# Patient Record
Sex: FEMALE | Race: WHITE | NOT HISPANIC OR LATINO | Employment: FULL TIME | URBAN - METROPOLITAN AREA
[De-identification: names, ages, dates, MRNs, and addresses within clinical notes are randomized per-mention and may not be internally consistent; named-entity substitution may affect disease eponyms.]

---

## 2018-12-17 ENCOUNTER — TELEPHONE (OUTPATIENT)
Dept: SCHEDULING | Facility: IMAGING CENTER | Age: 33
End: 2018-12-17

## 2019-01-11 ENCOUNTER — TELEPHONE (OUTPATIENT)
Dept: MEDICAL GROUP | Facility: MEDICAL CENTER | Age: 34
End: 2019-01-11

## 2019-01-11 NOTE — TELEPHONE ENCOUNTER
Left message with patient about no show to appointment today 1/11/19.  Explained that this was her first no show and the no show policy.

## 2019-02-05 ENCOUNTER — TELEPHONE (OUTPATIENT)
Dept: SCHEDULING | Facility: IMAGING CENTER | Age: 34
End: 2019-02-05

## 2019-02-21 ENCOUNTER — OFFICE VISIT (OUTPATIENT)
Dept: MEDICAL GROUP | Facility: CLINIC | Age: 34
End: 2019-02-21
Payer: MEDICAID

## 2019-02-21 VITALS
BODY MASS INDEX: 20.58 KG/M2 | TEMPERATURE: 100.2 F | WEIGHT: 135.8 LBS | HEART RATE: 76 BPM | DIASTOLIC BLOOD PRESSURE: 68 MMHG | OXYGEN SATURATION: 98 % | HEIGHT: 68 IN | SYSTOLIC BLOOD PRESSURE: 106 MMHG | RESPIRATION RATE: 14 BRPM

## 2019-02-21 DIAGNOSIS — B37.31 VAGINA, CANDIDIASIS: ICD-10-CM

## 2019-02-21 DIAGNOSIS — Z00.00 ENCOUNTER FOR MEDICAL EXAMINATION TO ESTABLISH CARE: ICD-10-CM

## 2019-02-21 DIAGNOSIS — M41.113 JUVENILE IDIOPATHIC SCOLIOSIS OF CERVICOTHORACIC REGION: ICD-10-CM

## 2019-02-21 PROCEDURE — 99203 OFFICE O/P NEW LOW 30 MIN: CPT | Performed by: PHYSICIAN ASSISTANT

## 2019-02-21 RX ORDER — FLUCONAZOLE 150 MG/1
150 TABLET ORAL ONCE
Qty: 1 TAB | Refills: 0 | Status: SHIPPED | OUTPATIENT
Start: 2019-02-21 | End: 2019-02-21

## 2019-02-21 ASSESSMENT — PATIENT HEALTH QUESTIONNAIRE - PHQ9: CLINICAL INTERPRETATION OF PHQ2 SCORE: 0

## 2019-02-21 NOTE — ASSESSMENT & PLAN NOTE
Patient recently moved to [place] from [place] and wishes to establish care with a primary care provider today.

## 2019-02-21 NOTE — PROGRESS NOTES
Chief Complaint   Patient presents with   • Establish Care   • Scoliosis     History of scoliosis    Vaginal discharge    HISTORY OF PRESENT ILLNESS: Patient is a 34 y.o. female established patient who presents today for evaluation and management of:    Juvenile idiopathic scoliosis of cervicothoracic region  Patient had two surgeries as a child to correct the severe scoliosis in her cervicothoracic spine. She hasn't had this checked in a while and wishes to see orthopedics again for this.     Vagina, candidiasis  Patient states she has had increase in white vaginal discharge over the past couple of days without a foul smell and with mild irritation. She has had this in the past and it was cured with monostat. She is requesting prescription medication for this today.     Encounter for medical examination to establish care  Patient recently moved to [place] from [place] and wishes to establish care with a primary care provider today.        Patient Active Problem List    Diagnosis Date Noted   • Juvenile idiopathic scoliosis of cervicothoracic region 02/21/2019   • Vagina, candidiasis 02/21/2019   • Encounter for medical examination to establish care 02/21/2019       Allergies:Patient has no known allergies.    Current Outpatient Prescriptions   Medication Sig Dispense Refill   • fluconazole (DIFLUCAN) 150 MG tablet Take 1 Tab by mouth Once for 1 dose. 1 Tab 0     No current facility-administered medications for this visit.        Social History   Substance Use Topics   • Smoking status: Never Smoker   • Smokeless tobacco: Never Used   • Alcohol use 1.2 oz/week     2 Glasses of wine per week       Family Status   Relation Status   • Mo Alive   • Fa Alive   • Bro Alive   • Child Alive   • Child Alive   • Child Alive     Family History   Problem Relation Age of Onset   • Arthritis Mother    • Blood Clots Mother    • Hyperlipidemia Father    • Hypertension Father    • Diabetes Father    • Depression Father    • ADD /  "ADHD Brother    • ADD / ADHD Child        Review of Systems: See HPI above.   Constitutional: Negative for fever, chills, weight loss and malaise.   HENT: Negative for ear pain, nosebleeds, congestion, sore throat and neck pain.    Eyes: Negative for blurred vision.   Respiratory: Negative for shortness of breath, cough, sputum production and wheezing.    Cardiovascular: Negative for chest pain, palpitations, orthopnea and leg swelling.   Gastrointestinal: Negative for heartburn, nausea, vomiting and abdominal pain.   Genitourinary: Negative for dysuria, urgency and frequency.   Musculoskeletal: Positive for right hip pain, right sided low back pain and left sided shoulder pain. Negative for myalgias, back pain and joint pain.   Skin: Negative for rash and itching.   Neurological: Negative for dizziness, tingling, tremors, sensory change, focal weakness and headaches.   Endo/Heme/Allergies: Does not bruise/bleed easily.   Psychiatric/Behavioral: Negative for depression, suicidal ideas and memory loss.  The patient is not nervous/anxious and does not have insomnia.      Exam:  Blood pressure 106/68, pulse 76, temperature 37.9 °C (100.2 °F), temperature source Temporal, resp. rate 14, height 1.715 m (5' 7.5\"), weight 61.6 kg (135 lb 12.8 oz), SpO2 98 %.  Body mass index is 20.96 kg/m².  General:  Healthy-Appearing female in NAD  Head: is grossly normal.  Neck: Supple without masses. Thyroid is not visibly enlarged.  Pulmonary: Clear to ausculation. Normal effort. No rales, ronchi, or wheezing.  Cardiovascular: Regular rate and rhythm without murmur. Carotid pulses are intact and equal bilaterally.  Extremities: no clubbing, cyanosis, or edema.  Musculoskeletal: severe scoliosis with curvature toward right side, uneven hips and left shoulder higher than right while seated, right back side higher than left when he leans forward.     Medical decision-making and discussion:  1. Juvenile idiopathic scoliosis of " cervicothoracic region    - DX-SPINE-SCOLIOSIS STUDY; Future  - REFERRAL TO ORTHOPEDICS    2. Vagina, candidiasis    - fluconazole (DIFLUCAN) 150 MG tablet; Take 1 Tab by mouth Once for 1 dose.  Dispense: 1 Tab; Refill: 0    3. Encounter for medical examination to establish care  I am happy to participate in the care of this pleasant 34 year old Woman.         Please note that this dictation was created using voice recognition software. I have made every reasonable attempt to correct obvious errors, but I expect that there are errors of grammar and possibly content that I did not discover before finalizing the note.      Return in about 1 year (around 2/21/2020) for Wellness Physical.

## 2019-02-21 NOTE — ASSESSMENT & PLAN NOTE
Patient had two surgeries as a child to correct the severe scoliosis in her cervicothoracic spine. She hasn't had this checked in a while and wishes to see orthopedics again for this.

## 2019-02-21 NOTE — ASSESSMENT & PLAN NOTE
Patient states she has had increase in white vaginal discharge over the past couple of days without a foul smell and with mild irritation. She has had this in the past and it was cured with monostat. She is requesting prescription medication for this today.

## 2019-03-22 ENCOUNTER — OFFICE VISIT (OUTPATIENT)
Dept: MEDICAL GROUP | Facility: CLINIC | Age: 34
End: 2019-03-22
Payer: MEDICAID

## 2019-03-22 VITALS
TEMPERATURE: 88.8 F | HEART RATE: 77 BPM | RESPIRATION RATE: 12 BRPM | WEIGHT: 131 LBS | BODY MASS INDEX: 19.85 KG/M2 | DIASTOLIC BLOOD PRESSURE: 74 MMHG | HEIGHT: 68 IN | OXYGEN SATURATION: 95 % | SYSTOLIC BLOOD PRESSURE: 106 MMHG

## 2019-03-22 DIAGNOSIS — J01.41 ACUTE RECURRENT PANSINUSITIS: ICD-10-CM

## 2019-03-22 PROBLEM — Z00.00 ENCOUNTER FOR MEDICAL EXAMINATION TO ESTABLISH CARE: Status: RESOLVED | Noted: 2019-02-21 | Resolved: 2019-03-22

## 2019-03-22 PROCEDURE — 99213 OFFICE O/P EST LOW 20 MIN: CPT | Performed by: PHYSICIAN ASSISTANT

## 2019-03-22 RX ORDER — CETIRIZINE HYDROCHLORIDE 10 MG/1
10 TABLET ORAL DAILY
Qty: 90 TAB | Refills: 0 | Status: SHIPPED | OUTPATIENT
Start: 2019-03-22 | End: 2019-06-13

## 2019-03-22 RX ORDER — AMOXICILLIN AND CLAVULANATE POTASSIUM 875; 125 MG/1; MG/1
TABLET, FILM COATED ORAL
COMMUNITY
Start: 2019-03-10 | End: 2019-08-01

## 2019-03-22 RX ORDER — FLUCONAZOLE 150 MG/1
TABLET ORAL
COMMUNITY
Start: 2019-02-21 | End: 2019-08-01

## 2019-03-22 NOTE — PROGRESS NOTES
Chief Complaint   Patient presents with   • Sinus Pain     meds not working       HISTORY OF PRESENT ILLNESS: Patient is a 34 y.o. female established patient who presents today for evaluation and management of:    Acute recurrent pansinusitis  Subacute, started about 3 months ago and was recently trreated with antibiotics but nasal sinus congestion persists. Vinicius has not been using allergy medications despite knowing that she has allergies. She has also not been doing sinus rinses although she states these have helped in the past. She denies fevers, chills and general malaise.        Patient Active Problem List    Diagnosis Date Noted   • Acute recurrent pansinusitis 03/22/2019   • Juvenile idiopathic scoliosis of cervicothoracic region 02/21/2019   • Vagina, candidiasis 02/21/2019       Allergies:Patient has no known allergies.    Current Outpatient Prescriptions   Medication Sig Dispense Refill   • amoxicillin-clavulanate (AUGMENTIN) 875-125 MG Tab      • cetirizine (ZYRTEC) 10 MG Tab Take 1 Tab by mouth every day. 90 Tab 0   • fluconazole (DIFLUCAN) 150 MG tablet        No current facility-administered medications for this visit.        Social History   Substance Use Topics   • Smoking status: Never Smoker   • Smokeless tobacco: Never Used   • Alcohol use 1.2 oz/week     2 Glasses of wine per week       Family Status   Relation Status   • Mo Alive   • Fa Alive   • Bro Alive   • Child Alive   • Child Alive   • Child Alive     Family History   Problem Relation Age of Onset   • Arthritis Mother    • Blood Clots Mother    • Hyperlipidemia Father    • Hypertension Father    • Diabetes Father    • Depression Father    • ADD / ADHD Brother    • ADD / ADHD Child        Review of Systems: See HPI above.   Constitutional: Negative for fever, chills, weight loss and malaise.   HENT: Positive for ear pain,  congestion, sore throat and cough. Negative for nosebleeds or neck pain.    Eyes: Negative for blurred vision.  "  Respiratory: Negative for shortness of breath,  sputum production and wheezing.    Cardiovascular: Negative for chest pain, palpitations, orthopnea and leg swelling.   Endo/Heme/Allergies: Does not bruise/bleed easily. See above for allergies.   Psychiatric/Behavioral: Negative for depression, suicidal ideas and memory loss.  The patient is not nervous/anxious and does not have insomnia.      Exam:  Blood pressure 106/74, pulse 77, temperature (!) 31.6 °C (88.8 °F), temperature source Temporal, resp. rate 12, height 1.715 m (5' 7.5\"), weight 59.4 kg (131 lb), SpO2 95 %.  Body mass index is 20.21 kg/m².  General:  Thin female in NAD  HEENT: head is grossly normal. Nose with severe septal deviation. Nasal mucousa pink with clear thick drainage.  Neck: Supple without masses. Thyroid is not visibly enlarged.  Pulmonary: Clear to ausculation. Normal effort. No rales, ronchi, or wheezing.  Cardiovascular: Regular rate and rhythm without murmur. Carotid pulses are intact and equal bilaterally.  Extremities: no clubbing, cyanosis, or edema.    Medical decision-making and discussion:  1. Acute recurrent pansinusitis  Start daily neti-pot after showering before bed. Remove allergens from home and clean home thoroughly.   - DX-SINUSES-PARANASAL LTD 2-; Future  - cetirizine (ZYRTEC) 10 MG Tab; Take 1 Tab by mouth every day.  Dispense: 90 Tab; Refill: 0      Please note that this dictation was created using voice recognition software. I have made every reasonable attempt to correct obvious errors, but I expect that there are errors of grammar and possibly content that I did not discover before finalizing the note.      Return in about 4 weeks (around 4/19/2019) for sinus issues. .  "

## 2019-03-22 NOTE — ASSESSMENT & PLAN NOTE
Subacute, started about 3 months ago and was recently trreated with antibiotics but nasal sinus congestion persists. Vinicius has not been using allergy medications despite knowing that she has allergies. She has also not been doing sinus rinses although she states these have helped in the past. She denies fevers, chills and general malaise.

## 2019-06-13 ENCOUNTER — OFFICE VISIT (OUTPATIENT)
Dept: MEDICAL GROUP | Facility: CLINIC | Age: 34
End: 2019-06-13
Payer: MEDICAID

## 2019-06-13 VITALS
BODY MASS INDEX: 20.88 KG/M2 | TEMPERATURE: 98.7 F | RESPIRATION RATE: 16 BRPM | WEIGHT: 133 LBS | OXYGEN SATURATION: 97 % | HEIGHT: 67 IN | DIASTOLIC BLOOD PRESSURE: 78 MMHG | SYSTOLIC BLOOD PRESSURE: 124 MMHG | HEART RATE: 80 BPM

## 2019-06-13 DIAGNOSIS — Z72.51 HIGH RISK HETEROSEXUAL BEHAVIOR: ICD-10-CM

## 2019-06-13 DIAGNOSIS — Z87.42 HISTORY OF ABNORMAL CERVICAL PAPANICOLAOU SMEAR: ICD-10-CM

## 2019-06-13 DIAGNOSIS — D18.01 CHERRY ANGIOMA: ICD-10-CM

## 2019-06-13 PROBLEM — B37.31 VAGINA, CANDIDIASIS: Status: RESOLVED | Noted: 2019-02-21 | Resolved: 2019-06-13

## 2019-06-13 PROCEDURE — 99213 OFFICE O/P EST LOW 20 MIN: CPT | Performed by: PHYSICIAN ASSISTANT

## 2019-06-13 RX ORDER — HYDROCODONE BITARTRATE AND ACETAMINOPHEN 5; 325 MG/1; MG/1
TABLET ORAL
COMMUNITY
Start: 2019-03-30 | End: 2019-08-01

## 2019-06-13 RX ORDER — CYCLOBENZAPRINE HCL 10 MG
TABLET ORAL
COMMUNITY
Start: 2019-03-30 | End: 2019-08-01

## 2019-06-13 RX ORDER — M-VIT,TX,IRON,MINS/CALC/FOLIC 27MG-0.4MG
1 TABLET ORAL DAILY
COMMUNITY
End: 2019-08-01

## 2019-06-13 RX ORDER — FEXOFENADINE HCL 60 MG/1
60 TABLET, FILM COATED ORAL DAILY
Qty: 30 TAB | Refills: 0
Start: 2019-06-13 | End: 2019-08-01

## 2019-06-13 NOTE — ASSESSMENT & PLAN NOTE
Unsure of STD status although has been with the same partner since pregnancy, can't recall the results of her tests when she was pregnant. Also presents with sexual partner today so patient may not be discussing entirety of sexual health at this time.

## 2019-06-13 NOTE — PROGRESS NOTES
Chief Complaint   Patient presents with   • Sexually Transmitted Diseases   • Other     skin lesion?       HISTORY OF PRESENT ILLNESS: Patient is a 34 y.o. female established patient who presents today for evaluation and management of:    High risk heterosexual behavior  Unsure of STD status although has been with the same partner since pregnancy, can't recall the results of her tests when she was pregnant. Also presents with sexual partner today so patient may not be discussing entirety of sexual health at this time.     History of abnormal cervical Papanicolaou smear  Has history of abnormal PAP smear with normal results after cryotherapy. Is overdue for recheck of this since it's been about a year. Denies symptoms.     Ornelas angioma  Over the past month or so, this patient has noticed 7 small bright red dots on her abdomen, arms and legs.  She is concerned about these and states they are not painful, growing or draining.       Patient Active Problem List    Diagnosis Date Noted   • Cherry angioma 06/13/2019   • History of abnormal cervical Papanicolaou smear 06/13/2019   • High risk heterosexual behavior 06/13/2019   • Acute recurrent pansinusitis 03/22/2019   • Juvenile idiopathic scoliosis of cervicothoracic region 02/21/2019       Allergies:Patient has no known allergies.    Current Outpatient Prescriptions   Medication Sig Dispense Refill   • fexofenadine (ALLEGRA) 60 MG Tab Take 1 Tab by mouth every day. 30 Tab 0   • therapeutic multivitamin-minerals (THERAGRAN-M) Tab Take 1 Tab by mouth every day.     • amoxicillin-clavulanate (AUGMENTIN) 875-125 MG Tab      • fluconazole (DIFLUCAN) 150 MG tablet        No current facility-administered medications for this visit.        Social History   Substance Use Topics   • Smoking status: Never Smoker   • Smokeless tobacco: Never Used   • Alcohol use 1.2 oz/week     2 Glasses of wine per week       Family Status   Relation Status   • Mo Alive   • Fa Alive   • Bro  "Alive   • Child Alive   • Child Alive   • Child Alive     Family History   Problem Relation Age of Onset   • Arthritis Mother    • Blood Clots Mother    • Hyperlipidemia Father    • Hypertension Father    • Diabetes Father    • Depression Father    • ADD / ADHD Brother    • ADD / ADHD Child        Review of Systems: See HPI above.   Constitutional: Negative for fever, chills, weight loss and malaise.   HENT: Negative for ear pain, nosebleeds, congestion, sore throat and neck pain.    Eyes: Negative for blurred vision.   Respiratory: Negative for shortness of breath, cough, sputum production and wheezing.    Cardiovascular: Negative for chest pain, palpitations, orthopnea and leg swelling.   Gastrointestinal: Negative for heartburn, nausea, vomiting and abdominal pain.   Genitourinary: Negative for dysuria, urgency and frequency.   Musculoskeletal: Negative for myalgias, back pain and joint pain.   Skin: Negative for rash and itching. See above.   Neurological: Negative for dizziness, tingling, tremors, sensory change, focal weakness and headaches.   Endo/Heme/Allergies: Does not bruise/bleed easily.   Psychiatric/Behavioral: Negative for depression, suicidal ideas and memory loss.  The patient is not nervous/anxious and does not have insomnia.      Exam:  /78 (BP Location: Right arm, Patient Position: Sitting, BP Cuff Size: Adult)   Pulse 80   Temp 37.1 °C (98.7 °F) (Temporal)   Resp 16   Ht 1.702 m (5' 7\")   Wt 60.3 kg (133 lb)   SpO2 97%   Body mass index is 20.83 kg/m².  General:  Healthy-Appearing female in NAD  Head: is grossly normal. Poor dentition.   Neck: Supple without masses. Thyroid is not visibly enlarged.  Pulmonary:  Normal effort.   Cardiovascular: Carotid pulses are intact and equal bilaterally.  Extremities: no clubbing, cyanosis, or edema.    Medical decision-making and discussion:  1. High risk heterosexual behavior    - HIV AG/AB COMBO ASSAY SCREENING; Future  - CHLAMYDIA/GC PCR " URINE OR SWAB; Future  - T.PALLIDUM AB EIA; Future  - HEPATITIS PANEL ACUTE(4 COMPONENTS); Future    2. History of abnormal cervical Papanicolaou smear    - REFERRAL TO OB/GYN    3. Ornelas angioma  reassured these are normal variant.       Please note that this dictation was created using voice recognition software. I have made every reasonable attempt to correct obvious errors, but I expect that there are errors of grammar and possibly content that I did not discover before finalizing the note.      Return if symptoms worsen or fail to improve.

## 2019-06-13 NOTE — ASSESSMENT & PLAN NOTE
Has history of abnormal PAP smear with normal results after cryotherapy. Is overdue for recheck of this since it's been about a year. Denies symptoms.

## 2019-06-13 NOTE — ASSESSMENT & PLAN NOTE
Over the past month or so, this patient has noticed 7 small bright red dots on her abdomen, arms and legs.  She is concerned about these and states they are not painful, growing or draining.

## 2019-06-14 ENCOUNTER — APPOINTMENT (OUTPATIENT)
Dept: MEDICAL GROUP | Facility: CLINIC | Age: 34
End: 2019-06-14
Payer: MEDICAID

## 2019-08-01 ENCOUNTER — OFFICE VISIT (OUTPATIENT)
Dept: MEDICAL GROUP | Facility: CLINIC | Age: 34
End: 2019-08-01
Payer: MEDICAID

## 2019-08-01 ENCOUNTER — HOSPITAL ENCOUNTER (OUTPATIENT)
Facility: MEDICAL CENTER | Age: 34
End: 2019-08-01
Attending: PHYSICIAN ASSISTANT
Payer: MEDICAID

## 2019-08-01 VITALS
HEIGHT: 67 IN | RESPIRATION RATE: 12 BRPM | DIASTOLIC BLOOD PRESSURE: 60 MMHG | OXYGEN SATURATION: 98 % | SYSTOLIC BLOOD PRESSURE: 114 MMHG | BODY MASS INDEX: 20.88 KG/M2 | WEIGHT: 133 LBS | TEMPERATURE: 98.6 F | HEART RATE: 77 BPM

## 2019-08-01 DIAGNOSIS — R00.2 INTERMITTENT PALPITATIONS: ICD-10-CM

## 2019-08-01 DIAGNOSIS — N30.00 ACUTE CYSTITIS WITHOUT HEMATURIA: ICD-10-CM

## 2019-08-01 DIAGNOSIS — J01.41 RECURRENT PANSINUSITIS: ICD-10-CM

## 2019-08-01 DIAGNOSIS — B37.9 CANDIDIASIS: ICD-10-CM

## 2019-08-01 PROBLEM — Z72.51 HIGH RISK HETEROSEXUAL BEHAVIOR: Status: RESOLVED | Noted: 2019-06-13 | Resolved: 2019-08-01

## 2019-08-01 LAB
APPEARANCE UR: CLEAR
APPEARANCE UR: CLEAR
BACTERIA #/AREA URNS HPF: ABNORMAL /HPF
BILIRUB UR QL STRIP.AUTO: NEGATIVE
BILIRUB UR STRIP-MCNC: NEGATIVE MG/DL
COLOR UR AUTO: NORMAL
COLOR UR: YELLOW
EPI CELLS #/AREA URNS HPF: ABNORMAL /HPF
GLUCOSE UR STRIP.AUTO-MCNC: NEGATIVE MG/DL
GLUCOSE UR STRIP.AUTO-MCNC: NEGATIVE MG/DL
HYALINE CASTS #/AREA URNS LPF: ABNORMAL /LPF
KETONES UR STRIP.AUTO-MCNC: NEGATIVE MG/DL
KETONES UR STRIP.AUTO-MCNC: NORMAL MG/DL
LEUKOCYTE ESTERASE UR QL STRIP.AUTO: ABNORMAL
LEUKOCYTE ESTERASE UR QL STRIP.AUTO: NORMAL
MICRO URNS: ABNORMAL
NITRITE UR QL STRIP.AUTO: NEGATIVE
NITRITE UR QL STRIP.AUTO: NEGATIVE
PH UR STRIP.AUTO: 5.5 [PH] (ref 5–8)
PH UR STRIP.AUTO: 6 [PH] (ref 5–8)
PROT UR QL STRIP: NEGATIVE MG/DL
PROT UR QL STRIP: NEGATIVE MG/DL
RBC # URNS HPF: ABNORMAL /HPF
RBC UR QL AUTO: ABNORMAL
RBC UR QL AUTO: NEGATIVE
SP GR UR STRIP.AUTO: 1.02
SP GR UR STRIP.AUTO: 1.03
UROBILINOGEN UR STRIP-MCNC: 0.2 MG/DL
UROBILINOGEN UR STRIP.AUTO-MCNC: 0.2 MG/DL
WBC #/AREA URNS HPF: ABNORMAL /HPF

## 2019-08-01 PROCEDURE — 99213 OFFICE O/P EST LOW 20 MIN: CPT | Mod: 25 | Performed by: PHYSICIAN ASSISTANT

## 2019-08-01 PROCEDURE — 81002 URINALYSIS NONAUTO W/O SCOPE: CPT | Performed by: PHYSICIAN ASSISTANT

## 2019-08-01 PROCEDURE — 81001 URINALYSIS AUTO W/SCOPE: CPT

## 2019-08-01 RX ORDER — NITROFURANTOIN 25; 75 MG/1; MG/1
100 CAPSULE ORAL EVERY 12 HOURS
Qty: 10 CAP | Refills: 0 | Status: SHIPPED | OUTPATIENT
Start: 2019-08-01 | End: 2019-08-06

## 2019-08-01 RX ORDER — FLUCONAZOLE 150 MG/1
150 TABLET ORAL ONCE
Qty: 1 TAB | Refills: 0 | Status: SHIPPED | OUTPATIENT
Start: 2019-08-01 | End: 2019-08-01

## 2019-08-01 SDOH — HEALTH STABILITY: MENTAL HEALTH: HOW OFTEN DO YOU HAVE A DRINK CONTAINING ALCOHOL?: 2-4 TIMES A MONTH

## 2019-08-01 SDOH — HEALTH STABILITY: MENTAL HEALTH: HOW MANY STANDARD DRINKS CONTAINING ALCOHOL DO YOU HAVE ON A TYPICAL DAY?: 1 OR 2

## 2019-08-01 SDOH — HEALTH STABILITY: MENTAL HEALTH: HOW OFTEN DO YOU HAVE 6 OR MORE DRINKS ON ONE OCCASION?: NEVER

## 2019-08-01 NOTE — ASSESSMENT & PLAN NOTE
HPI:  Symptom onset: 7 days ago   Current symptoms: Painful, urgent, frequent voids. Itchy labia. No blood noted in urine.   Since onset symptoms are: Unchanged  Treatments tried: OTC antifungal  Associated symptoms: Negative for fever, flank pain, nausea and vomiting, vaginal discharge, pelvic pain.  History is positive for frequent UTI.

## 2019-08-01 NOTE — ASSESSMENT & PLAN NOTE
This patient states that about once per month she will have the sensation that she loses her breath and that her heart beats very quickly.  She is mostly concerned because her significant other has early onset heart failure.  She denies lightheadedness lasting longer than 5 seconds she is unsure if this is worse when she is dehydrated but does not notice any difference when she is drinking caffeine or alcohol.

## 2019-08-01 NOTE — PATIENT INSTRUCTIONS
Urinary Tract Infection, Adult  Introduction  A urinary tract infection (UTI) is an infection of any part of the urinary tract. The urinary tract includes the:  · Kidneys.  · Ureters.  · Bladder.  · Urethra.  These organs make, store, and get rid of pee (urine) in the body.  Follow these instructions at home:  · Take over-the-counter and prescription medicines only as told by your doctor.  · If you were prescribed an antibiotic medicine, take it as told by your doctor. Do not stop taking the antibiotic even if you start to feel better.  · Avoid the following drinks:  ¨ Alcohol.  ¨ Caffeine.  ¨ Tea.  ¨ Carbonated drinks.  · Drink enough fluid to keep your pee clear or pale yellow.  · Keep all follow-up visits as told by your doctor. This is important.  · Make sure to:  ¨ Empty your bladder often and completely. Do not to hold pee for long periods of time.  ¨ Empty your bladder before and after sex.  ¨ Wipe from front to back after a bowel movement if you are female. Use each tissue one time when you wipe.  Contact a doctor if:  · You have back pain.  · You have a fever.  · You feel sick to your stomach (nauseous).  · You throw up (vomit).  · Your symptoms do not get better after 3 days.  · Your symptoms go away and then come back.  Get help right away if:  · You have very bad back pain.  · You have very bad lower belly (abdominal) pain.  · You are throwing up and cannot keep down any medicines or water.  This information is not intended to replace advice given to you by your health care provider. Make sure you discuss any questions you have with your health care provider.  Document Released: 06/05/2009 Document Revised: 05/25/2017 Document Reviewed: 11/07/2016  © 2017 [employer]    
Orthopedic

## 2019-08-01 NOTE — PROGRESS NOTES
Chief Complaint   Patient presents with   • Vaginitis   • Sinus Problem   • Irregular Heart Beat     skips beat        HISTORY OF PRESENT ILLNESS: Patient is a 34 y.o. female established patient who presents today for evaluation and management of:    Acute cystitis without hematuria  HPI:  Symptom onset: 7 days ago   Current symptoms: Painful, urgent, frequent voids. Itchy labia. No blood noted in urine.   Since onset symptoms are: Unchanged  Treatments tried: OTC antifungal  Associated symptoms: Negative for fever, flank pain, nausea and vomiting, vaginal discharge, pelvic pain.  History is positive for frequent UTI.     Intermittent palpitations  This patient states that about once per month she will have the sensation that she loses her breath and that her heart beats very quickly.  She is mostly concerned because her significant other has early onset heart failure.  She denies lightheadedness lasting longer than 5 seconds she is unsure if this is worse when she is dehydrated but does not notice any difference when she is drinking caffeine or alcohol.    Recurrent pansinusitis  This is now her chronic condition that started about 6 months ago.  This patient was treated with antibiotics multiple times and nasal sinus congestion persists.  She is requesting referral to ear nose and throat.  She does not feel as though decongestion or antihistamine medicines are working part.       Patient Active Problem List    Diagnosis Date Noted   • Intermittent palpitations 08/01/2019   • Acute cystitis without hematuria 08/01/2019   • Cherry angioma 06/13/2019   • History of abnormal cervical Papanicolaou smear 06/13/2019   • Recurrent pansinusitis 03/22/2019   • Juvenile idiopathic scoliosis of cervicothoracic region 02/21/2019       Allergies:Patient has no known allergies.    Current Outpatient Medications   Medication Sig Dispense Refill   • nitrofurantoin monohyd macro (MACROBID) 100 MG Cap Take 1 Cap by mouth every 12  hours for 5 days. 10 Cap 0   • fluconazole (DIFLUCAN) 150 MG tablet Take 1 Tab by mouth Once for 1 dose. 1 Tab 0     No current facility-administered medications for this visit.        Social History     Tobacco Use   • Smoking status: Never Smoker   • Smokeless tobacco: Never Used   Substance Use Topics   • Alcohol use: Yes     Frequency: 2-4 times a month     Drinks per session: 1 or 2     Binge frequency: Never   • Drug use: No       Family Status   Relation Name Status   • Mo  Alive   • Fa  Alive   • Bro  Alive   • Child  Alive   • Child  Alive   • Child  Alive     Family History   Problem Relation Age of Onset   • Arthritis Mother    • Blood Clots Mother    • Hyperlipidemia Father    • Hypertension Father    • Diabetes Father    • Depression Father    • ADD / ADHD Brother    • ADD / ADHD Child        Review of Systems: See HPI above.   Constitutional: Negative for fever, chills, weight loss and malaise.   HENT: Negative for ear pain, nosebleeds, congestion, sore throat and neck pain.    Eyes: Negative for blurred vision.   Respiratory: Negative for shortness of breath, cough, sputum production and wheezing.    Cardiovascular: Negative for chest pain, orthopnea and leg swelling.   Gastrointestinal: Negative for heartburn, nausea, vomiting and abdominal pain.   Genitourinary: Positive for dysuria, urgency and frequency as well as itchy labia.   Musculoskeletal: Negative for myalgias, back pain and joint pain.   Skin: Negative for rash and itching.   Neurological: Negative for dizziness, tingling, tremors, sensory change, focal weakness and headaches.   Endo/Heme/Allergies: Does not bruise/bleed easily.   Psychiatric/Behavioral: Negative for depression, suicidal ideas and memory loss.  The patient is not nervous/anxious and does not have insomnia.      Exam:  /60 (BP Location: Left arm, Patient Position: Sitting, BP Cuff Size: Adult)   Pulse 77   Temp 37 °C (98.6 °F) (Temporal)   Resp 12   Ht 1.702 m (5'  "7\")   Wt 60.3 kg (133 lb)   SpO2 98%   Body mass index is 20.83 kg/m².  General:  Thin female in NAD  Head: is grossly normal.  Neck: Supple without masses. Thyroid is not visibly enlarged.  Pulmonary: Clear to ausculation. Normal effort. No rales, ronchi, or wheezing.  Cardiovascular: Regular rate and rhythm without murmur. Carotid pulses are intact and equal bilaterally.  Extremities: no clubbing, cyanosis, or edema.    Medical decision-making and discussion:  1. Acute cystitis without hematuria  1. Abnormal urine dipstick in office. Urine sent for culture. Start antibiotics.  2. Provided education to drink plenty of fluids, wipe front to back every void and bowel movement.   3. Return to clinic if symptoms not improving within 3-4 days or in case of vomiting, fever, increasing pain.  - Urinalysis, Culture if Indicated; Future  - POCT Urinalysis  - nitrofurantoin monohyd macro (MACROBID) 100 MG Cap; Take 1 Cap by mouth every 12 hours for 5 days.  Dispense: 10 Cap; Refill: 0    2. Candidiasis  In case this patinet develops a yeast infection from her antibiotics:  - fluconazole (DIFLUCAN) 150 MG tablet; Take 1 Tab by mouth Once for 1 dose.  Dispense: 1 Tab; Refill: 0    3. Intermittent palpitations  Considering PVC's as primary differential.   - [facility] - Cardiology Performed (48HR); Future    4. Recurrent pansinusitis    - REFERRAL TO ENT      Please note that this dictation was created using voice recognition software. I have made every reasonable attempt to correct obvious errors, but I expect that there are errors of grammar and possibly content that I did not discover before finalizing the note.      Return if symptoms worsen or fail to improve.    "

## 2019-08-01 NOTE — ASSESSMENT & PLAN NOTE
This is now her chronic condition that started about 6 months ago.  This patient was treated with antibiotics multiple times and nasal sinus congestion persists.  She is requesting referral to ear nose and throat.  She does not feel as though decongestion or antihistamine medicines are working part.

## 2019-11-21 ENCOUNTER — HOSPITAL ENCOUNTER (OUTPATIENT)
Facility: MEDICAL CENTER | Age: 34
End: 2019-11-21
Attending: PHYSICIAN ASSISTANT
Payer: MEDICAID

## 2019-11-21 ENCOUNTER — OFFICE VISIT (OUTPATIENT)
Dept: MEDICAL GROUP | Facility: CLINIC | Age: 34
End: 2019-11-21
Payer: MEDICAID

## 2019-11-21 VITALS
HEART RATE: 77 BPM | WEIGHT: 128 LBS | HEIGHT: 67 IN | SYSTOLIC BLOOD PRESSURE: 120 MMHG | DIASTOLIC BLOOD PRESSURE: 76 MMHG | TEMPERATURE: 97.8 F | RESPIRATION RATE: 16 BRPM | OXYGEN SATURATION: 96 % | BODY MASS INDEX: 20.09 KG/M2

## 2019-11-21 DIAGNOSIS — Z12.4 SCREENING FOR MALIGNANT NEOPLASM OF CERVIX: ICD-10-CM

## 2019-11-21 DIAGNOSIS — Z01.419 WELL FEMALE EXAM WITH ROUTINE GYNECOLOGICAL EXAM: ICD-10-CM

## 2019-11-21 PROCEDURE — 87591 N.GONORRHOEAE DNA AMP PROB: CPT

## 2019-11-21 PROCEDURE — 88175 CYTOPATH C/V AUTO FLUID REDO: CPT

## 2019-11-21 PROCEDURE — 87491 CHLMYD TRACH DNA AMP PROBE: CPT

## 2019-11-21 PROCEDURE — 99395 PREV VISIT EST AGE 18-39: CPT | Mod: EP | Performed by: PHYSICIAN ASSISTANT

## 2019-11-21 NOTE — PROGRESS NOTES
SUBJECTIVE: 34 y.o. female for routine pap and checkup.  Chief Complaint   Patient presents with   • Gynecologic Exam     Concerned about RLQ pain related to possible ovarian cyst.       Last Pap: 2018  Contraception: tubal ligation  H/O Abnormal Pap yes, recently cleared by gynecology to return to PCP  H/O STI yes, treated with antibiotic has a low risk form of HPV  Current partner: male, monogamous     LMP: Patient's last menstrual period was 2019 (exact date).    Allergies: Patient has no known allergies.     ROS:  Menses every month with mild cramping  Bleeding is moderate.    no analgesics required during menses.  Positive for menstrual depression, labile mood, anxiety, bloating/fluid retention, insomnia, migraine headaches, libido changes   no menopause symptoms of hot flashes, night sweats, sleep disruption, mood changes, vaginal dryness.   No pelvic pain, or dyspareunia. No vaginal discharge   No breast tenderness, mass, nipple discharge, changes in size or contour, or abnormal cyclic discomfort.  No urinary tract symptoms, no incontinence.   No abdominal pain, change in bowel habits, black or bloody stools.    No unusual headaches, no visual changes.   No prolonged cough. No dyspnea or chest pain on exertion.    No skin lesions, concerns.     Preventive Care:  Mammogram not due til 42   DEXA not due  Colonoscopy not due  HPV vaccine - unsure if she got these.     No current outpatient medications on file.     No current facility-administered medications for this visit.      She  has no past medical history on file.  She  has a past surgical history that includes tubal ligation and dilation and curettage.     Family History:   Family History   Problem Relation Age of Onset   • Arthritis Mother    • Blood Clots Mother    • Hyperlipidemia Father    • Hypertension Father    • Diabetes Father    • Depression Father    • ADD / ADHD Brother    • ADD / ADHD Child        Family History negative for :  "Colon, Lung, or female organ cancer, osteoporosis.     OBJECTIVE:   /76 (BP Location: Left arm, Patient Position: Sitting, BP Cuff Size: Adult)   Pulse 77   Temp 36.6 °C (97.8 °F) (Temporal)   Resp 16   Ht 1.702 m (5' 7\")   Wt 58.1 kg (128 lb)   LMP 11/17/2019 (Exact Date)   SpO2 96%   BMI 20.05 kg/m²   Body mass index is 20.05 kg/m².      HEAD AND NECK:  Ears normal.  Throat, oral cavity and tongue normal.  Neck supple. No adenopathy or masses in the neck or supraclavicular regions.  No carotid bruits. No thyromegaly.   NEURO: Cranial nerves are normal. DTR's normal and symmetric.    CHEST:  Clear, good air entry, no wheezes or rales.   HEART:  Regular rate and rhythm.  S1 and S2 normal. No edema or JVD.   ABDOMEN:  Soft without tenderness, guarding, mass or organomegaly.  No CVA tenderness or inguinal adenopathy.   EXTREMITIES:  Extremities, reflexes and peripheral pulses are normal.    SKIN:  No rashes or suspicious skin lesions noted.     Breast Exam: Performed with instruction during examination. No axillary lymphadenopathy. No fixed or dominant masses. No nipple retraction or skin abnormalities.    PELVIC EXAMINATION    Chaperone Marialuisa Neumann MA    Labia: normal, no lesions or erythema noted   Urethral Orifice: normal  Vagina: vaginal canal clear, no lesions  Cervix: No polyps, masses or lesions noted. Copious clear mucoid discharge noted.     BIMANUAL EXAM   Vaginal Walls: normal  Cervix: No CMT  Uterus: nontender without masses or enlargement  to palpation  Adnexa: no tenderness or notable masses to palpation  Weak kegel muscles.       <ASSESSMENT>  1. Screening for malignant neoplasm of cervix  THINPREP RFLX HPV ASC AND ABOVE W/CTNG   2. Well female exam with routine gynecological exam  THINPREP RFLX HPV ASC AND ABOVE W/CTNG       Discussed breast self exam, mammography screening, family planning choices and adequate intake of calcium and vitamin D   Follow-up in 1 years for next Gyn exam " and Pap if all tests collected today are normal.

## 2019-11-22 DIAGNOSIS — Z12.4 SCREENING FOR MALIGNANT NEOPLASM OF CERVIX: ICD-10-CM

## 2019-11-22 DIAGNOSIS — Z01.419 WELL FEMALE EXAM WITH ROUTINE GYNECOLOGICAL EXAM: ICD-10-CM

## 2019-11-22 LAB
C TRACH DNA GENITAL QL NAA+PROBE: NEGATIVE
CYTOLOGY REG CYTOL: NORMAL
N GONORRHOEA DNA GENITAL QL NAA+PROBE: NEGATIVE
SPECIMEN SOURCE: NORMAL

## 2019-11-27 ENCOUNTER — TELEPHONE (OUTPATIENT)
Dept: MEDICAL GROUP | Facility: CLINIC | Age: 34
End: 2019-11-27

## 2019-11-27 NOTE — TELEPHONE ENCOUNTER
----- Message from Millie Beach P.A.-C. sent at 11/26/2019  8:11 PM PST -----  I reviewed PAP smear. Everything is within normal limits and looks good. Return for follow up as scheduled.